# Patient Record
Sex: FEMALE | Race: BLACK OR AFRICAN AMERICAN | NOT HISPANIC OR LATINO | Employment: FULL TIME | URBAN - METROPOLITAN AREA
[De-identification: names, ages, dates, MRNs, and addresses within clinical notes are randomized per-mention and may not be internally consistent; named-entity substitution may affect disease eponyms.]

---

## 2018-01-10 ENCOUNTER — HOSPITAL ENCOUNTER (EMERGENCY)
Facility: HOSPITAL | Age: 23
Discharge: HOME | End: 2018-01-11
Attending: EMERGENCY MEDICINE | Admitting: EMERGENCY MEDICINE

## 2018-01-10 VITALS
HEIGHT: 68 IN | HEART RATE: 79 BPM | TEMPERATURE: 97.2 F | SYSTOLIC BLOOD PRESSURE: 127 MMHG | DIASTOLIC BLOOD PRESSURE: 74 MMHG | OXYGEN SATURATION: 98 % | WEIGHT: 180 LBS | BODY MASS INDEX: 27.28 KG/M2 | RESPIRATION RATE: 16 BRPM

## 2018-01-10 DIAGNOSIS — F33.1 MODERATE EPISODE OF RECURRENT MAJOR DEPRESSIVE DISORDER (HCC): Primary | ICD-10-CM

## 2018-01-10 LAB
AMPHET+METHAMPHET UR QL: NEGATIVE
BARBITURATES UR QL SCN: NEGATIVE
BENZODIAZ UR QL SCN: NEGATIVE
CANNABINOIDS SERPL QL: NEGATIVE
COCAINE UR QL: NEGATIVE
ETHANOL BLD-MCNC: <10 MG/DL (ref 0–10)
ETHANOL UR QL: <0.01 %
METHADONE UR QL SCN: NEGATIVE
OPIATES UR QL: NEGATIVE
OXYCODONE UR QL SCN: NEGATIVE

## 2018-01-10 PROCEDURE — 80307 DRUG TEST PRSMV CHEM ANLYZR: CPT | Performed by: NURSE PRACTITIONER

## 2018-01-10 PROCEDURE — 99283 EMERGENCY DEPT VISIT LOW MDM: CPT

## 2018-01-10 PROCEDURE — 90791 PSYCH DIAGNOSTIC EVALUATION: CPT

## 2018-01-10 RX ORDER — ESCITALOPRAM OXALATE 5 MG/1
10 TABLET ORAL DAILY
Qty: 30 TABLET | Refills: 0 | Status: SHIPPED | OUTPATIENT
Start: 2018-01-10 | End: 2018-01-11

## 2018-01-10 RX ORDER — ESCITALOPRAM OXALATE 5 MG/1
5 TABLET ORAL DAILY
COMMUNITY
End: 2018-01-10

## 2018-01-11 RX ORDER — ESCITALOPRAM OXALATE 20 MG/1
20 TABLET ORAL DAILY
Qty: 30 TABLET | Refills: 0 | Status: SHIPPED | OUTPATIENT
Start: 2018-01-11

## 2018-01-11 NOTE — ED PROVIDER NOTES
Pt presents to the ED c/o worsening depression after having a significant break up and starting her menstrual period. She has associated sleep disturbances and decreased appetite. She denies SI and hallucinations. She uses melatonin but has minimal results in staying asleep. Pt has not had to have any hospitalization for depression. On exam, RRR w/o murmur, CTAB, neck supple w/o lymph adenopathy, abd soft non tender and non distended with nml active BS. Discussed the plan to get the labs resulted and for her to speak to the [facility].      Attestation:  I supervised care provided by the midlevel provider.    We have discussed this patient's history, physical exam, and treatment plan.   I have reviewed the note and personally saw and examined the patient and agree with the plan of care.    Documentation assistance provided by gertrude Sheth for Dr. Castillo. Information recorded by the scribe was done at my direction and has been verified and validated by me.       Marquita Sheth  01/10/18 0594       Juan Jose Castillo MD  01/11/18 7726

## 2018-01-11 NOTE — ED NOTES
Per pt's mother- pt reports not wanting to be here anymore and she's also going through a break-up.  Pt reports sporadic episodes of these thoughts and starts to cry.      Priscila Fierro RN  01/10/18 2025

## 2018-01-11 NOTE — ED PROVIDER NOTES
EMERGENCY DEPARTMENT ENCOUNTER    Room Number:  43/43  Date seen:  1/10/2018  Time seen: 8:29 PM  PCP: DAYANA Felder    HPI:  Chief complaint: depression   Context:Coretta Le is a 22 y.o. female who presents to the ED with c/o depression and anxiety, which she has had for about 3 years. The pt has taken lexapro which she states helped improve her depression at first, but has since stopped relieving her depression. The pt states that her depression is worse around her menstrual period. The pt states that her depression has progressively worsened after she recently ended a relationship. She states that she has episodes where she has been continuously crying and cannot stop. The pt states that she has not gone to school for the last few days because of her continual depression. The pt does not currently see a therapist. The pt c/o decreased appetite.The pt denies hallucinations, SI, HI, or being in an abusive relationship.  The pt states that she occasionally uses marijuana, but that she does not use it regularly.      Timing:episodic  Duration: 3 years  Quality:depression  Intensity/Severity:moderate  Associated Symptoms:decreased appetite  Aggravating Factors:Pt states that her depression worsens around her period.   Alleviating Factors:none  Previous Episodes:The pt has a history of depression and anxiety.   Treatment before arrival:The pt has taken lexapro with no recent relief.       ALLERGIES  Nickel    PAST MEDICAL HISTORY  Active Ambulatory Problems     Diagnosis Date Noted   • No Active Ambulatory Problems     Resolved Ambulatory Problems     Diagnosis Date Noted   • No Resolved Ambulatory Problems     No Additional Past Medical History       PAST SURGICAL HISTORY  History reviewed. No pertinent surgical history.    FAMILY HISTORY  History reviewed. No pertinent family history.    SOCIAL HISTORY  Social History     Social History   • Marital status: Single     Spouse name: N/A   • Number of  children: N/A   • Years of education: N/A     Occupational History   • Not on file.     Social History Main Topics   • Smoking status: Never Smoker   • Smokeless tobacco: Not on file   • Alcohol use Yes      Comment: occasionally   • Drug use: Yes     Special: Marijuana   • Sexual activity: Not on file     Other Topics Concern   • Not on file     Social History Narrative   • No narrative on file       REVIEW OF SYSTEMS  Review of Systems   Constitutional: Positive for appetite change (decreased). Negative for activity change, diaphoresis and fever.   HENT: Negative for trouble swallowing.    Eyes: Negative for visual disturbance.   Respiratory: Negative for cough, chest tightness, shortness of breath and wheezing.    Cardiovascular: Negative for chest pain, palpitations and leg swelling.   Gastrointestinal: Negative for abdominal pain, diarrhea, nausea and vomiting.   Genitourinary: Negative for dysuria.   Musculoskeletal: Negative for back pain.   Skin: Negative for rash.   Neurological: Negative for dizziness, speech difficulty and light-headedness.   Psychiatric/Behavioral: The patient is nervous/anxious.         Depression       PHYSICAL EXAM  ED Triage Vitals   Temp Heart Rate Resp BP SpO2   01/10/18 2019 01/10/18 2019 01/10/18 2019 01/10/18 2019 01/10/18 2019   97.5 °F (36.4 °C) 66 18 111/72 98 %      Temp src Heart Rate Source Patient Position BP Location FiO2 (%)   01/10/18 2019 01/10/18 2019 -- -- --   Tympanic Monitor        Physical Exam   Constitutional: She is oriented to person, place, and time and well-developed, well-nourished, and in no distress. No distress.   HENT:   Head: Normocephalic and atraumatic.   Mouth/Throat: Uvula is midline and mucous membranes are normal.   Neck: Normal range of motion. Neck supple.   Cardiovascular: Normal rate, regular rhythm and S2 normal.  Exam reveals no gallop and no friction rub.    No murmur heard.  Split S1 heart sound   Pulmonary/Chest: Effort normal and  breath sounds normal. No respiratory distress. She has no decreased breath sounds. She has no wheezes. She has no rhonchi. She has no rales.   Abdominal: Soft. Normal appearance. There is no rebound and no guarding.   Musculoskeletal: Normal range of motion.   Neurological: She is alert and oriented to person, place, and time.   Skin: Skin is warm, dry and intact.   Psychiatric: Affect and judgment normal. She expresses no homicidal and no suicidal ideation. She expresses no suicidal plans and no homicidal plans.   The pt has a sad affect which is appropriate. The pt does not exhibit self harm behavior.    Nursing note and vitals reviewed.      LAB RESULTS  Recent Results (from the past 24 hour(s))   Urine Drug Screen - Urine, Clean Catch    Collection Time: 01/10/18  8:54 PM   Result Value Ref Range    Amphet/Methamphet, Screen Negative Negative    Barbiturates Screen, Urine Negative Negative    Benzodiazepine Screen, Urine Negative Negative    Cocaine Screen, Urine Negative Negative    Opiate Screen Negative Negative    THC, Screen, Urine Negative Negative    Methadone Screen, Urine Negative Negative    Oxycodone Screen, Urine Negative Negative   Ethanol    Collection Time: 01/10/18  9:25 PM   Result Value Ref Range    Ethanol <10 0 - 10 mg/dL    Ethanol % <0.010 %       I ordered the above labs and reviewed the results      MEDICATIONS GIVEN IN ER  Medications - No data to display      PROCEDURES  Procedures    COURSE & MEDICAL DECISION MAKING  Pertinent Labs and Imaging studies that were ordered and reviewed are noted above.  Results were reviewed/discussed with the patient and they were also made aware of online assess.  Pt also made aware that some labs, such as cultures, will not be resulted during ER visit and follow up with PMD is necessary.     PROGRESS AND CONSULTS    Progress Notes:    ED Course     2100: Ordered psych evaluation.     2150: Reviewed pt's history and workup with Dr. Castillo.  After a  "bedside evaluation; Dr Castillo agrees with the plan of care.    2300: Discussed pt's case with Tariq matta [facility]. He states that the pt can be discharged home with a referral to psychiatry.     2352:The patient's history, physical exam, and lab findings were discussed with the physician, who also performed a face to face history and physical exam.  I discussed all results and noted any abnormalities with patient.  Discussed absoute need to recheck abnormalities with their family physician.  I answered any of the patient's questions.  Discussed plan for discharge, as there is no emergent indication for admission.  Pt is agreeable and understands need for follow up and repeat testing.  Pt is aware that discharge does not mean that nothing is wrong but it indicates no emergency is present and they must continue care with their family physician.  Pt is discharged with instructions to follow up with primary care doctor to have their blood pressure rechecked.       Disposition vitals:  /74 (BP Location: Right arm, Patient Position: Sitting)  Pulse 79  Temp 97.2 °F (36.2 °C) (Oral)   Resp 16  Ht 172.7 cm (68\")  Wt 81.6 kg (180 lb)  SpO2 98%  BMI 27.37 kg/m2      DIAGNOSIS  Final diagnoses:   Moderate episode of recurrent major depressive disorder       FOLLOW UP   DAYANA Felder  [address] [address]  [phone]    Schedule an appointment as soon as possible for a visit        RX     Medication List      Changed          escitalopram 5 MG tablet   Commonly known as:  LEXAPRO   Take 2 tablets by mouth Daily.   What changed:  how much to take             Documentation assistance provided by gertrude Garcia for LORI Monterroso.  Information recorded by the franciscoibnasim was done at my direction and has been verified and validated by me.  Electronically signed by Paola Garcia on 1/10/2018 at time 11:52 PM           Paola Garcia  01/10/18 9382       LORI Andersen  01/11/18 " 7038

## 2018-01-11 NOTE — DISCHARGE INSTRUCTIONS
Return Precautions    Although you are being discharged from the ED today, I encourage you to return for worsening symptoms.  Things can, and do, change such that treatment at home with medication may not be adequate.      Specifically, return for any of the following:    Chest pain, shortness of breath, pain or nausea and vomiting not controlled by medications provided.    Please make a follow up with your Primary Care Provider for a blood pressure recheck.     It is reasonable to increase your dose of Lexapro to 10 mg daily.  Get some daily exercise.  Make yourself do some walking outside EVEN IF YOU DON'T WANT TO.     Return to Emergency Room for worsening depression ESPECIALLY if you have any thoughts of hurting yourself    Please follow up with the Outpatient Resources that Tariq the [facility] RN has provided you.

## 2018-01-11 NOTE — CONSULTS
"Pt's friend Mandeep Phelps in ED, friend denies any recent statements by pt indicating thoughts of harming herself or others.     Pt states she came in because \"I've just been really down and feeling depressed for the last few days\". States she her partner or about 7 months, broke up with her 3 days ago. States she was taking escitalopram (lexapro), prescribed by PCP, states it was initially helpful, but hasn't been working as well lately, particularly prior to her menstrual cycle monthly. States she did counseling for a few months 3 years ago.     Denies ever having thought of killing or harming herself. Denies any recent thoughts of harming self/others. Denies any hx of intentional self harm bx's or inpatient psychiatric hospitalization.     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 States she hasn't been to any of her classes. States she was previously active with [employer]'s counseling services for students. States she is not currently planning to withdraw, but that the withdrawal date is in a few weeks, but doesn't want to do this. Talks with me about keeping the option open to withdraw if that's what she needs to do r/t her depression.     Reports she uses marijuana \"maybe once every 2 or 3 months\". Denies other drugs. Denies tobacco use. Reports drinking 1 alcoholic drink 3-4 times weekly.     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 Reports she has asked her friend Molly to take over more of her [email or web address] duties, given her depression.     Denies any recent thoughts of wishing she were dead, \"not wishing I were dead, when I've been having the crying spells, I just wish I could sleep\". Denies wishing she could sleep and not wake up, just that she could sleep until she felt better.     Reports she takes melatonin, but awakens frequently, sleeping about 3 hours daily for past 3 days.     Asked to identify a reason NOT to try to harm or kill herself: \"I know " "I'll get through this eventually, and I have things I want to do in the future ... I want to be a teacher, and I want to get  and have children\".     Engages in discussion about outpatient mental health services, requesting additional referrals to [employer] student services, stating that in the past her counselor graduated, and she would have to start all over with another counselor. Engages in detailed f/u discussion indicating additional future orientation and intention of outpatient f/u.     I then spoke with pt's parents, Bettyjodi Le, [phone], and Sparkle Barr, [phone]. Both patents deny and recent statements by pt indicating thoughts of harming herself or others. Parents report they went to pt's PCP, who recommended they go to [facility], who were closing as they arrived, so parents brought pt to ED.     Mom thinks pt is on 10 mg daily of lexapro.     Plan to d/c with outpatient referrals, ALFONZO Vargas agrees with plan.     "

## 2021-06-09 ENCOUNTER — IMMUNIZATION (OUTPATIENT)
Dept: VACCINE CLINIC | Facility: HOSPITAL | Age: 26
End: 2021-06-09

## 2021-06-09 PROCEDURE — 91300 HC SARSCOV02 VAC 30MCG/0.3ML IM: CPT | Performed by: INTERNAL MEDICINE

## 2021-06-09 PROCEDURE — 0001A: CPT | Performed by: INTERNAL MEDICINE

## 2021-06-30 ENCOUNTER — IMMUNIZATION (OUTPATIENT)
Dept: VACCINE CLINIC | Facility: HOSPITAL | Age: 26
End: 2021-06-30

## 2021-06-30 PROCEDURE — 91300 HC SARSCOV02 VAC 30MCG/0.3ML IM: CPT | Performed by: INTERNAL MEDICINE

## 2021-06-30 PROCEDURE — 0002A: CPT | Performed by: INTERNAL MEDICINE

## 2024-10-28 ENCOUNTER — HOSPITAL ENCOUNTER (EMERGENCY)
Facility: HOSPITAL | Age: 29
Discharge: HOME | End: 2024-10-29
Admitting: EMERGENCY MEDICINE
Payer: COMMERCIAL

## 2024-10-28 VITALS
DIASTOLIC BLOOD PRESSURE: 92 MMHG | SYSTOLIC BLOOD PRESSURE: 146 MMHG | HEIGHT: 70 IN | BODY MASS INDEX: 27.2 KG/M2 | HEART RATE: 88 BPM | OXYGEN SATURATION: 98 % | WEIGHT: 190 LBS | RESPIRATION RATE: 16 BRPM | TEMPERATURE: 98 F

## 2024-10-28 DIAGNOSIS — R51.9 ACUTE NONINTRACTABLE HEADACHE, UNSPECIFIED HEADACHE TYPE: Primary | ICD-10-CM

## 2024-10-28 PROCEDURE — 93005 ELECTROCARDIOGRAM TRACING: CPT | Performed by: NURSE PRACTITIONER

## 2024-10-28 PROCEDURE — 25010000002 DROPERIDOL PER 5 MG: Performed by: NURSE PRACTITIONER

## 2024-10-28 PROCEDURE — 99284 EMERGENCY DEPT VISIT MOD MDM: CPT

## 2024-10-28 PROCEDURE — 96374 THER/PROPH/DIAG INJ IV PUSH: CPT

## 2024-10-28 RX ORDER — DIPHENHYDRAMINE HYDROCHLORIDE 50 MG/ML
INJECTION INTRAMUSCULAR; INTRAVENOUS
Status: DISCONTINUED
Start: 2024-10-28 | End: 2024-10-29 | Stop reason: HOSPADM

## 2024-10-28 RX ORDER — DIPHENHYDRAMINE HYDROCHLORIDE 50 MG/ML
25 INJECTION INTRAMUSCULAR; INTRAVENOUS ONCE
Status: COMPLETED | OUTPATIENT
Start: 2024-10-29 | End: 2024-10-29

## 2024-10-28 RX ORDER — DROPERIDOL 2.5 MG/ML
2.5 INJECTION, SOLUTION INTRAMUSCULAR; INTRAVENOUS ONCE
Status: COMPLETED | OUTPATIENT
Start: 2024-10-28 | End: 2024-10-28

## 2024-10-28 RX ADMIN — DROPERIDOL 2.5 MG: 2.5 INJECTION, SOLUTION INTRAMUSCULAR; INTRAVENOUS at 23:49

## 2024-10-29 ENCOUNTER — APPOINTMENT (OUTPATIENT)
Dept: CT IMAGING | Facility: HOSPITAL | Age: 29
End: 2024-10-29
Payer: COMMERCIAL

## 2024-10-29 LAB
QT INTERVAL: 416 MS
QTC INTERVAL: 398 MS

## 2024-10-29 PROCEDURE — 96375 TX/PRO/DX INJ NEW DRUG ADDON: CPT

## 2024-10-29 PROCEDURE — 25010000002 DIPHENHYDRAMINE PER 50 MG: Performed by: NURSE PRACTITIONER

## 2024-10-29 PROCEDURE — 70450 CT HEAD/BRAIN W/O DYE: CPT

## 2024-10-29 RX ADMIN — DIPHENHYDRAMINE HYDROCHLORIDE 25 MG: 50 INJECTION, SOLUTION INTRAMUSCULAR; INTRAVENOUS at 00:00

## 2024-10-29 NOTE — ED PROVIDER NOTES
Subjective   History of Present Illness  Patient is a 29-year-old female presents emergency department complaint of a headache.  She reports a history of migraines, and has had similar headaches before.  This began this afternoon.  Denies any trauma or injury reports photophobia, nausea vomiting.  No neck pain or stiffness.  No fevers.    Patient denies visual disturbances, speech disturbances, facial droop, unilateral weakness, numbness or tingling.  Denies difficulty walking or lethargy.    Denies a thunderclap headache.    Reports the only home medication is escitalopram    Pt reports Is being allergic to Zofran.   Review of Systems    History reviewed. No pertinent past medical history.    Allergies   Allergen Reactions    Droperidol Anxiety and Palpitations    Nickel        History reviewed. No pertinent surgical history.    History reviewed. No pertinent family history.    Social History     Socioeconomic History    Marital status: Single   Tobacco Use    Smoking status: Never   Substance and Sexual Activity    Alcohol use: Yes     Comment: occasionally    Drug use: Yes     Types: Marijuana           Objective   Physical Exam  Vitals and nursing note reviewed.   Constitutional:       Appearance: Normal appearance. She is not toxic-appearing.   HENT:      Head: Normocephalic and atraumatic.      Nose: Nose normal.      Mouth/Throat:      Mouth: Mucous membranes are moist.      Pharynx: Oropharynx is clear.   Eyes:      Extraocular Movements: Extraocular movements intact.      Conjunctiva/sclera: Conjunctivae normal.      Pupils: Pupils are equal, round, and reactive to light.   Neck:      Trachea: Trachea and phonation normal.      Meningeal: Brudzinski's sign absent.   Cardiovascular:      Rate and Rhythm: Normal rate and regular rhythm.      Heart sounds: Normal heart sounds. No murmur heard.     No friction rub. No gallop.   Pulmonary:      Effort: Pulmonary effort is normal.      Breath sounds: Normal  breath sounds.   Abdominal:      General: Bowel sounds are normal.      Palpations: Abdomen is soft.   Musculoskeletal:         General: Normal range of motion.      Cervical back: Normal range of motion and neck supple. No rigidity.      Right lower leg: No edema.      Left lower leg: No edema.   Skin:     General: Skin is warm and dry.      Capillary Refill: Capillary refill takes less than 2 seconds.      Findings: No erythema or rash.   Neurological:      Mental Status: She is alert and oriented to person, place, and time.         Procedures           ED Course  ED Course as of 10/29/24 0142   Tue Oct 29, 2024   0008 Pt had reaction to droperidol. Became very anxious  [LB]   0110 Pt feels much better. She wants to go home.  [LB]      ED Course User Index  [LB] Tania Huynh APRN      CT Head Without Contrast    Result Date: 10/29/2024  Impression: No acute intracranial process. Electronically Signed: Brandy Fowler MD  10/29/2024 12:29 AM EDT  Workstation ID: [id number]                                            Medical Decision Making  Chart Review: Previous ED note 1/10/2018  EKG interpreted independently per ED attending physican-Dr. Mesa.  Sinus bradycardia rate of 55.  , QTc 398.  No acute ST changes.  No previous.      Pt was Placed on appropriate monitoring.  Differential diagnoses considered for patient presentation, this list is not all inclusive of diagnoses considered: Intracranial hemorrhage, migraine  Patient presents to the ED for the above complaint, underwent the above, exam and workup.  Patient has a grossly normal neurological exam.  Doubt intracranial hemorrhage or SAH.  Patient does report this has been similar to her previous headaches and migraines, however she has not any imaging in the past therefore imaging was obtained reveals no acute findings today.    She was given droperidol initially, however she did have a reaction this, can became very anxious and agitated, she  was given Benadryl and symptoms resolved.  On reexam her pain is gone, and she feels much improved she wants to go home.  I have given her information to follow-up with neurology, advise follow-up PCP as well    Considertion was given for admission, however patient has reassuring exam and workup in the ed and appears appropriate for discharge home and continued outpatient care.     We discussed my findings, plan of care, discharge instructions, the importance of follow up with their PCP/ and or specialist for repeat evaluation and to discuss any abnormal findings in labs or imaging that warrant further outpatient evaluation. We discussed that although a definitive diagnosis is not always found in the ED, it is believed emergent conditions have been ruled out, and patient is safe for discharge at this time.  We discussed return precautions for the emergency department.  Patient verbalizes understandings, and agrees with current plan of care.      Note Disclaimer: At [facility], we believe that sharing information builds trust and better relationships. You are receiving this note because you recently visited [facility]. It is possible you will see health information before a provider has talked with you about it. This kind of information can be easy to misunderstand. To help you fully understand what it means for your health, we urge you to discuss this note with your provider  Note dictated utilizing Dragon Dictation.  Appropriate PPE worn during patient interactions.        Final diagnoses:   Acute nonintractable headache, unspecified headache type       ED Disposition  ED Disposition       ED Disposition   Discharge    Condition   Stable    Comment   --               Cortez Celeste PA  [address]  [facility] [address] [phone]          [facility] EMERGENCY DEPARTMENT  [facility] [facility] [address] [phone]        Seipel, Joseph F, MD  [address]  [facility] [address]  [phone]    Schedule an appointment as soon as possible for a visit            Medication List      No changes were made to your prescriptions during this visit.            Tania Huynh, APRN  10/29/24 0142

## 2024-10-29 NOTE — DISCHARGE INSTRUCTIONS
Please follow-up with neurology as above for evaluation of headaches  Return to ED for new or worsening symptoms  Follow-up with your primary care provider, call for an appointment

## 2024-11-09 ENCOUNTER — HOSPITAL ENCOUNTER (EMERGENCY)
Facility: HOSPITAL | Age: 29
Discharge: HOME | End: 2024-11-09
Attending: EMERGENCY MEDICINE
Payer: MEDICAID

## 2024-11-09 ENCOUNTER — HOSPITAL ENCOUNTER (EMERGENCY)
Facility: HOSPITAL | Age: 29
Discharge: HOME | End: 2024-11-09
Payer: MEDICAID

## 2024-11-09 VITALS
TEMPERATURE: 98.3 F | OXYGEN SATURATION: 100 % | RESPIRATION RATE: 20 BRPM | HEIGHT: 68 IN | SYSTOLIC BLOOD PRESSURE: 111 MMHG | BODY MASS INDEX: 30.51 KG/M2 | DIASTOLIC BLOOD PRESSURE: 71 MMHG | HEART RATE: 59 BPM | WEIGHT: 201.28 LBS

## 2024-11-09 VITALS
DIASTOLIC BLOOD PRESSURE: 83 MMHG | WEIGHT: 201.28 LBS | RESPIRATION RATE: 20 BRPM | TEMPERATURE: 98.8 F | HEIGHT: 68 IN | SYSTOLIC BLOOD PRESSURE: 123 MMHG | BODY MASS INDEX: 30.51 KG/M2 | HEART RATE: 61 BPM | OXYGEN SATURATION: 97 %

## 2024-11-09 DIAGNOSIS — R51.9 ACUTE NONINTRACTABLE HEADACHE, UNSPECIFIED HEADACHE TYPE: Primary | ICD-10-CM

## 2024-11-09 DIAGNOSIS — R51.9 NONINTRACTABLE HEADACHE, UNSPECIFIED CHRONICITY PATTERN, UNSPECIFIED HEADACHE TYPE: Primary | ICD-10-CM

## 2024-11-09 PROCEDURE — 96372 THER/PROPH/DIAG INJ SC/IM: CPT

## 2024-11-09 PROCEDURE — 25010000002 KETOROLAC TROMETHAMINE PER 15 MG: Performed by: EMERGENCY MEDICINE

## 2024-11-09 PROCEDURE — 99282 EMERGENCY DEPT VISIT SF MDM: CPT

## 2024-11-09 RX ORDER — KETOROLAC TROMETHAMINE 30 MG/ML
30 INJECTION, SOLUTION INTRAMUSCULAR; INTRAVENOUS ONCE
Status: COMPLETED | OUTPATIENT
Start: 2024-11-09 | End: 2024-11-09

## 2024-11-09 RX ORDER — BUTALBITAL, ACETAMINOPHEN AND CAFFEINE 50; 325; 40 MG/1; MG/1; MG/1
1 TABLET ORAL EVERY 6 HOURS PRN
Qty: 10 TABLET | Refills: 0 | Status: SHIPPED | OUTPATIENT
Start: 2024-11-09

## 2024-11-09 RX ADMIN — KETOROLAC TROMETHAMINE 30 MG: 30 INJECTION, SOLUTION INTRAMUSCULAR at 09:50

## 2024-11-09 NOTE — ED PROVIDER NOTES
"Subjective   History of Present Illness  29-year-old female states she has had a long history of headaches including migraine management as a teenager who states that she was seen in the emergency room 2 weeks ago with headache and had a negative CT scan and had improvement with treatment in the emergency room but headache is returned and has been fairly constant over the last week.  She reports no associated vomiting or fevers.  She is not describing thunderclap onset.  She reports no blurry vision or numbness or weakness or stiff neck.  The pain is in her bilateral eyebrows.  Review of Systems  Last menstruation started 1 week ago  No past medical history on file.  Migraines  Allergies   Allergen Reactions    Droperidol Anxiety and Palpitations    Nickel        No past surgical history on file.    No family history on file.    Social History     Socioeconomic History    Marital status: Single   Tobacco Use    Smoking status: Never   Substance and Sexual Activity    Alcohol use: Yes     Comment: occasionally    Drug use: Yes     Types: Marijuana     Prior to Admission medications    Medication Sig Start Date End Date Taking? Authorizing Provider   butalbital-acetaminophen-caffeine (FIORICET, ESGIC) -40 MG per tablet Take 1 tablet by mouth Every 6 (Six) Hours As Needed for Headache. 11/9/24   Andrew Hall MD   escitalopram (LEXAPRO) 20 MG tablet Take 1 tablet by mouth Daily. 1/11/18   Juan Jose Castillo MD     /75   Pulse 73   Temp 98.1 °F (36.7 °C)   Resp 20   Ht 172.7 cm (68\")   Wt 91.3 kg (201 lb 4.5 oz)   LMP 11/08/2024   SpO2 96%   BMI 30.60 kg/m²         Objective   Physical Exam  General: Well-developed well-appearing, no acute distress, alert and appropriate  Eyes: Pupils round and reactive, sclera nonicteric, extraocular motion intact, some minor tenderness palpation over the bilateral eyebrows, no soft tissue swelling, lids normal  HEENT: Mucous membranes moist, no mucosal " swelling  Neck: Supple, no nuchal rigidity,   Respirations: Respirations nonlabored, equal breath sounds bilaterally, clear lungs  Heart regular rate and rhythm, no murmurs rubs or gallops,   Abdomen soft nontender nondistended,   Extremities no clubbing cyanosis or edema, calves are symmetric and nontender  Neuro cranial nerves II through XII intact , normal sensory/motor function and strength in four extremities, no slurred speech, no facial droop, normal finger to nose, normal heel to shin, no nuchal rigidity  Psych oriented, pleasant affect  Skin no rash,   Procedures           ED Course                    No data recorded                             Medical Decision Making  Differential diagnosis including meningitis, subarachnoid hemorrhage, cavernous sinus thrombosis    I reviewed the CT report from 2 weeks ago showing no acute abnormalities.  Notably patient is not having headache characteristics of subarachnoid or meningitis.  She does report a long history of headache issues and is scheduling to see neurologist outpatient.  CST felt to be unlikely based on patient's history and today.  Tension type headache is favored, we also discussed the potential need for optometry evaluation to rule out any eyestrain as an etiology as she does wear glasses chronically and she is not had her vision checked in over a year.  Based on exam and history headache appears to be of a benign origin and we discussed acute management.  She wishes to forego any sedative medications today so she can drive home so she was ordered some Toradol in the emergency room and was prescribed Fioricet.  She is encouraged to follow-up with primary care and neurology as well as with her eye doctor.  Patient voiced understanding the plan and was discharged in condition and was given warning signs for return.    Amount and/or Complexity of Data Reviewed  External Data Reviewed: radiology and notes.     Details: Patient treated for migraine  headache couple weeks ago droperidol and where she had an adverse reaction to the droperidol.    Risk  Prescription drug management.        Final diagnoses:   Acute nonintractable headache, unspecified headache type       ED Disposition  ED Disposition       ED Disposition   Discharge    Condition   Stable    Comment   --               No follow-up provider specified.       Medication List        New Prescriptions      butalbital-acetaminophen-caffeine -40 MG per tablet  Commonly known as: FIORICET, ESGIC  Take 1 tablet by mouth Every 6 (Six) Hours As Needed for Headache.               Where to Get Your Medications        These medications were sent to [facility]/pharmacy #[id number] - [place], [place] - [address] [place] [address] RD. AT [facility] - [phone]  - [phone]   [address] [place] [address] [address] [place] [id number]      Phone: [phone]   butalbital-acetaminophen-caffeine -40 MG per tablet            Andrew Hall MD  11/09/24 0942       Andrew Hall MD  11/09/24 0959

## 2024-11-09 NOTE — DISCHARGE INSTRUCTIONS
Follow-up with primary care and neurologist as planned.  Also consider eye exam with optometrist.  Fioricet for headache.  Return for increased pain, vomiting, fever or any other concerns.

## 2024-11-10 NOTE — ED PROVIDER NOTES
"Subjective   History of Present Illness  Chief complaint: Cephalgia      Context: Patient is a 29-year-old female who presents with friend with complaints of frontal headache and \"eyestrain.\"  She states she was seen here earlier today for the same although her headache has improved, she has not yet filled her Fioricet.  She states she does agree that she likely has some eyestrain and has not seen ophthalmology to have her glasses evaluated in over a year and came in today stating she wanted to be evaluated for eyestrain.  She denies any new symptoms from her earlier evaluation today, no reported fever thunderclap type noise.      PCP:           Review of Systems   Constitutional:  Negative for fever.       No past medical history on file.    Allergies   Allergen Reactions    Droperidol Anxiety and Palpitations    Nickel        No past surgical history on file.    No family history on file.    Social History     Socioeconomic History    Marital status: Single   Tobacco Use    Smoking status: Never   Substance and Sexual Activity    Alcohol use: Yes     Comment: occasionally    Drug use: Yes     Types: Marijuana           Objective   Physical Exam    Vital signs and traige nurse note reviewed.  Constitutional:  Awake, alert; well developed and well nourished.  No acute distress, friend at bedside.  Nontoxic in appearance sitting with the lights on in no acute distress  HEENT:  Normocephalic, atraumatic;  with intact EOM;    Neck: Supple, full range of motion without pain;   Pulmonary: Respiratory effort regular, nonlabored; :   Neuro: Alert oriented x3, speech is clear and appropriate.      Procedures           ED Course                    No data recorded                             Medical Decision Making      /83 (BP Location: Left arm)   Pulse 61   Temp 98.8 °F (37.1 °C) (Oral)   Resp 20   Ht 172.7 cm (68\")   Wt 91.3 kg (201 lb 4.5 oz)   LMP 11/08/2024   SpO2 97%   BMI 30.60 kg/m²      Chart review: " ER evaluation 1029 and 11 9 for headache      Radiology interpretation: CT reviewed from 1029  Lab interpretation:  Labs ordered but not felt to be emergently warranted          Appropriate PPE worn during exam.  Patient states she is here for ophthalmology evaluation for her concern about eyestrain and to have her glasses adjusted.  We discussed she would need to see an ophthalmologist for that; she was seen earlier today approximately a week ago for thought to be a tension type headache and she states she does not want any further evaluation and will take her prescription Fioricet that she was given earlier today.  She denies any other new complaints just wanted to have her eyes evaluated.  She states she will follow-up with her ophthalmologist for that.  Patient friend at bedside.      i discussed findings with patient who voices understanding of discharge instructions, signs and symptoms requiring return to ED; discharged improved and in stable condition with follow up for re-evaluation.  This document is intended for medical expert use only. Reading of this document by patients and/or patient's family without participating medical staff guidance may result in misinterpretation and unintended morbidity.  Any interpretation of such data is the responsibility of the patient and/or family member responsible for the patient in concert with their primary or specialist providers, not to be left for sources of online searches such as [employer], [employer] or similar queries. Relying on these approaches to knowledge may result in misinterpretation, misguided goals of care and even death should patients or family members try recommendations outside of the realm of professional medical care in a supervised inpatient environment.         Problems Addressed:  Nonintractable headache, unspecified chronicity pattern, unspecified headache type: acute illness or injury        Final diagnoses:   Nonintractable headache, unspecified  chronicity pattern, unspecified headache type       ED Disposition  ED Disposition       ED Disposition   Discharge    Condition   Stable    Comment   --               Cortez Celeste, PA  [address] [place] [address]  [phone]               Medication List      No changes were made to your prescriptions during this visit.            Luba Buchanan, APRN  11/09/24 1943

## 2024-11-10 NOTE — DISCHARGE INSTRUCTIONS
Prescriptions as directed.  Follow-up with your ophthalmologist and primary care doctor.  Return for any new or worsening symptoms.  Limit your bluelight exposure

## 2025-07-09 ENCOUNTER — HOSPITAL ENCOUNTER (EMERGENCY)
Facility: HOSPITAL | Age: 30
Discharge: HOME | End: 2025-07-10
Attending: EMERGENCY MEDICINE
Payer: MEDICAID

## 2025-07-09 ENCOUNTER — APPOINTMENT (OUTPATIENT)
Dept: CT IMAGING | Facility: HOSPITAL | Age: 30
End: 2025-07-09
Payer: MEDICAID

## 2025-07-09 DIAGNOSIS — R10.9 ABDOMINAL PAIN, UNSPECIFIED ABDOMINAL LOCATION: Primary | ICD-10-CM

## 2025-07-09 DIAGNOSIS — K59.00 CONSTIPATION, UNSPECIFIED CONSTIPATION TYPE: ICD-10-CM

## 2025-07-09 DIAGNOSIS — N83.209 CYST OF OVARY, UNSPECIFIED LATERALITY: ICD-10-CM

## 2025-07-09 DIAGNOSIS — R11.2 NAUSEA AND VOMITING, UNSPECIFIED VOMITING TYPE: ICD-10-CM

## 2025-07-09 LAB
ALBUMIN SERPL-MCNC: 4.7 G/DL (ref 3.5–5.2)
ALBUMIN/GLOB SERPL: 1.5 G/DL
ALP SERPL-CCNC: 57 U/L (ref 39–117)
ALT SERPL W P-5'-P-CCNC: <5 U/L (ref 1–33)
ANION GAP SERPL CALCULATED.3IONS-SCNC: 17.1 MMOL/L (ref 5–15)
AST SERPL-CCNC: 17 U/L (ref 1–32)
BASOPHILS # BLD MANUAL: 0.08 10*3/MM3 (ref 0–0.2)
BASOPHILS NFR BLD MANUAL: 1 % (ref 0–1.5)
BILIRUB SERPL-MCNC: 0.6 MG/DL (ref 0–1.2)
BUN SERPL-MCNC: 8.7 MG/DL (ref 6–20)
BUN/CREAT SERPL: 11.3 (ref 7–25)
CALCIUM SPEC-SCNC: 9.6 MG/DL (ref 8.6–10.5)
CHLORIDE SERPL-SCNC: 104 MMOL/L (ref 98–107)
CO2 SERPL-SCNC: 18.9 MMOL/L (ref 22–29)
CREAT SERPL-MCNC: 0.77 MG/DL (ref 0.57–1)
DEPRECATED RDW RBC AUTO: 37 FL (ref 37–54)
EGFRCR SERPLBLD CKD-EPI 2021: 107.2 ML/MIN/1.73
ERYTHROCYTE [DISTWIDTH] IN BLOOD BY AUTOMATED COUNT: 13 % (ref 12.3–15.4)
GLOBULIN UR ELPH-MCNC: 3.1 GM/DL
GLUCOSE SERPL-MCNC: 148 MG/DL (ref 65–99)
HCG SERPL QL: NEGATIVE
HCT VFR BLD AUTO: 37.1 % (ref 34–46.6)
HGB BLD-MCNC: 13 G/DL (ref 12–15.9)
LIPASE SERPL-CCNC: 27 U/L (ref 13–60)
LYMPHOCYTES # BLD MANUAL: 0.89 10*3/MM3 (ref 0.7–3.1)
LYMPHOCYTES NFR BLD MANUAL: 4 % (ref 5–12)
MCH RBC QN AUTO: 27.7 PG (ref 26.6–33)
MCHC RBC AUTO-ENTMCNC: 35 G/DL (ref 31.5–35.7)
MCV RBC AUTO: 79.1 FL (ref 79–97)
MONOCYTES # BLD: 0.32 10*3/MM3 (ref 0.1–0.9)
NEUTROPHILS # BLD AUTO: 6.76 10*3/MM3 (ref 1.7–7)
NEUTROPHILS NFR BLD MANUAL: 84 % (ref 42.7–76)
PLATELET # BLD AUTO: 281 10*3/MM3 (ref 140–450)
PMV BLD AUTO: 11.3 FL (ref 6–12)
POTASSIUM SERPL-SCNC: 3.7 MMOL/L (ref 3.5–5.2)
PROT SERPL-MCNC: 7.8 G/DL (ref 6–8.5)
RBC # BLD AUTO: 4.69 10*6/MM3 (ref 3.77–5.28)
RBC MORPH BLD: NORMAL
SCAN SLIDE: NORMAL
SMALL PLATELETS BLD QL SMEAR: ADEQUATE
SODIUM SERPL-SCNC: 140 MMOL/L (ref 136–145)
VARIANT LYMPHS NFR BLD MANUAL: 11 % (ref 19.6–45.3)
WBC MORPH BLD: NORMAL
WBC NRBC COR # BLD AUTO: 8.05 10*3/MM3 (ref 3.4–10.8)

## 2025-07-09 PROCEDURE — 96375 TX/PRO/DX INJ NEW DRUG ADDON: CPT

## 2025-07-09 PROCEDURE — 96374 THER/PROPH/DIAG INJ IV PUSH: CPT

## 2025-07-09 PROCEDURE — 74177 CT ABD & PELVIS W/CONTRAST: CPT

## 2025-07-09 PROCEDURE — 85007 BL SMEAR W/DIFF WBC COUNT: CPT | Performed by: EMERGENCY MEDICINE

## 2025-07-09 PROCEDURE — 25010000002 DIPHENHYDRAMINE PER 50 MG: Performed by: EMERGENCY MEDICINE

## 2025-07-09 PROCEDURE — 81003 URINALYSIS AUTO W/O SCOPE: CPT | Performed by: EMERGENCY MEDICINE

## 2025-07-09 PROCEDURE — 99285 EMERGENCY DEPT VISIT HI MDM: CPT

## 2025-07-09 PROCEDURE — 25010000002 ONDANSETRON PER 1 MG: Performed by: EMERGENCY MEDICINE

## 2025-07-09 PROCEDURE — 85025 COMPLETE CBC W/AUTO DIFF WBC: CPT | Performed by: EMERGENCY MEDICINE

## 2025-07-09 PROCEDURE — 25510000001 IOPAMIDOL PER 1 ML: Performed by: EMERGENCY MEDICINE

## 2025-07-09 PROCEDURE — 84703 CHORIONIC GONADOTROPIN ASSAY: CPT | Performed by: EMERGENCY MEDICINE

## 2025-07-09 PROCEDURE — 25010000002 HYDROMORPHONE PER 4 MG: Performed by: EMERGENCY MEDICINE

## 2025-07-09 PROCEDURE — 83690 ASSAY OF LIPASE: CPT | Performed by: EMERGENCY MEDICINE

## 2025-07-09 PROCEDURE — 25810000003 SODIUM CHLORIDE 0.9 % SOLUTION: Performed by: EMERGENCY MEDICINE

## 2025-07-09 PROCEDURE — 80053 COMPREHEN METABOLIC PANEL: CPT | Performed by: EMERGENCY MEDICINE

## 2025-07-09 RX ORDER — HYDROMORPHONE HYDROCHLORIDE 1 MG/ML
0.5 INJECTION, SOLUTION INTRAMUSCULAR; INTRAVENOUS; SUBCUTANEOUS ONCE
Refills: 0 | Status: COMPLETED | OUTPATIENT
Start: 2025-07-09 | End: 2025-07-09

## 2025-07-09 RX ORDER — SODIUM CHLORIDE 0.9 % (FLUSH) 0.9 %
10 SYRINGE (ML) INJECTION AS NEEDED
Status: DISCONTINUED | OUTPATIENT
Start: 2025-07-09 | End: 2025-07-10 | Stop reason: HOSPADM

## 2025-07-09 RX ORDER — IOPAMIDOL 755 MG/ML
100 INJECTION, SOLUTION INTRAVASCULAR
Status: COMPLETED | OUTPATIENT
Start: 2025-07-09 | End: 2025-07-09

## 2025-07-09 RX ORDER — DIPHENHYDRAMINE HYDROCHLORIDE 50 MG/ML
25 INJECTION, SOLUTION INTRAMUSCULAR; INTRAVENOUS ONCE
Status: COMPLETED | OUTPATIENT
Start: 2025-07-09 | End: 2025-07-09

## 2025-07-09 RX ORDER — ONDANSETRON 2 MG/ML
4 INJECTION INTRAMUSCULAR; INTRAVENOUS ONCE
Status: COMPLETED | OUTPATIENT
Start: 2025-07-09 | End: 2025-07-09

## 2025-07-09 RX ADMIN — ONDANSETRON 4 MG: 2 INJECTION, SOLUTION INTRAMUSCULAR; INTRAVENOUS at 21:47

## 2025-07-09 RX ADMIN — SODIUM CHLORIDE 500 ML: 9 INJECTION, SOLUTION INTRAVENOUS at 21:46

## 2025-07-09 RX ADMIN — DIPHENHYDRAMINE HYDROCHLORIDE 25 MG: 50 INJECTION INTRAMUSCULAR; INTRAVENOUS at 22:07

## 2025-07-09 RX ADMIN — IOPAMIDOL 100 ML: 755 INJECTION, SOLUTION INTRAVENOUS at 22:44

## 2025-07-09 RX ADMIN — HYDROMORPHONE HYDROCHLORIDE 0.5 MG: 1 INJECTION, SOLUTION INTRAMUSCULAR; INTRAVENOUS; SUBCUTANEOUS at 21:47

## 2025-07-09 NOTE — Clinical Note
[facility] EMERGENCY DEPARTMENT  [address] [facility] [place] [address]  Phone: [phone]    Coretta Le was seen and treated in our emergency department on 7/9/2025.  She may return to work on 07/13/2025.         Thank you for choosing [facility].    Nancy Rojas, RN

## 2025-07-10 VITALS
HEIGHT: 68 IN | DIASTOLIC BLOOD PRESSURE: 68 MMHG | SYSTOLIC BLOOD PRESSURE: 115 MMHG | OXYGEN SATURATION: 100 % | TEMPERATURE: 98.2 F | RESPIRATION RATE: 16 BRPM | HEART RATE: 89 BPM | WEIGHT: 191.8 LBS | BODY MASS INDEX: 29.07 KG/M2

## 2025-07-10 LAB
BILIRUB UR QL STRIP: NEGATIVE
CLARITY UR: CLEAR
COLOR UR: YELLOW
GLUCOSE UR STRIP-MCNC: NEGATIVE MG/DL
HGB UR QL STRIP.AUTO: NEGATIVE
KETONES UR QL STRIP: ABNORMAL
LEUKOCYTE ESTERASE UR QL STRIP.AUTO: NEGATIVE
NITRITE UR QL STRIP: NEGATIVE
PH UR STRIP.AUTO: 8.5 [PH] (ref 5–8)
PROT UR QL STRIP: NEGATIVE
SP GR UR STRIP: 1.08 (ref 1–1.03)
UROBILINOGEN UR QL STRIP: ABNORMAL

## 2025-07-10 RX ORDER — DOCUSATE SODIUM 100 MG/1
100 CAPSULE, LIQUID FILLED ORAL 2 TIMES DAILY
Qty: 60 CAPSULE | Refills: 0 | Status: SHIPPED | OUTPATIENT
Start: 2025-07-10

## 2025-07-10 RX ORDER — DICYCLOMINE HCL 20 MG
20 TABLET ORAL EVERY 8 HOURS PRN
Qty: 30 TABLET | Refills: 0 | Status: SHIPPED | OUTPATIENT
Start: 2025-07-10

## 2025-07-10 RX ORDER — ONDANSETRON 4 MG/1
4 TABLET, ORALLY DISINTEGRATING ORAL EVERY 6 HOURS PRN
Qty: 20 TABLET | Refills: 0 | Status: SHIPPED | OUTPATIENT
Start: 2025-07-10

## 2025-07-10 NOTE — ED PROVIDER NOTES
"Subjective   History of Present Illness  Chief complaint: Patient is a 30-year-old who presents with abdominal pain.  She is very distressed currently and cannot provide significant history.  She has had progressive pain since Friday.  She really has not had much of a bowel movement since last week as well.  She tried laxatives today with no help.  She has not had problems like this before.  She has not had fever.  She states her pain is \"everywhere\".  She has not had any abdominal surgeries in the past.    Context:    Duration:    Timing:    Severity:    Associated Symptoms:        PCP:  LMP:      Review of Systems   Constitutional:  Negative for fever.   Respiratory: Negative.     Cardiovascular: Negative.    Gastrointestinal:  Positive for abdominal pain, constipation, nausea and vomiting.   Genitourinary: Negative.        No past medical history on file.    Allergies   Allergen Reactions    Droperidol Anxiety and Palpitations    Nickel        No past surgical history on file.    No family history on file.    Social History     Socioeconomic History    Marital status: Single   Tobacco Use    Smoking status: Never   Substance and Sexual Activity    Alcohol use: Yes     Comment: occasionally    Drug use: Yes     Types: Marijuana           Objective   Physical Exam  Vitals and nursing note reviewed.   Constitutional:       General: She is in acute distress.   HENT:      Head: Normocephalic and atraumatic.   Cardiovascular:      Rate and Rhythm: Normal rate.   Pulmonary:      Effort: Pulmonary effort is normal.      Breath sounds: Normal breath sounds.   Abdominal:      General: Abdomen is protuberant. Bowel sounds are normal.      Palpations: Abdomen is soft.      Tenderness: There is generalized abdominal tenderness.   Skin:     General: Skin is warm and dry.   Neurological:      Mental Status: She is alert and oriented to person, place, and time.   Psychiatric:         Mood and Affect: Mood is anxious. "         Procedures           ED Course                                           Results for orders placed or performed during the hospital encounter of 07/09/25   Comprehensive Metabolic Panel    Collection Time: 07/09/25  9:48 PM    Specimen: Blood   Result Value Ref Range    Glucose 148 (H) 65 - 99 mg/dL    BUN 8.7 6.0 - 20.0 mg/dL    Creatinine 0.77 0.57 - 1.00 mg/dL    Sodium 140 136 - 145 mmol/L    Potassium 3.7 3.5 - 5.2 mmol/L    Chloride 104 98 - 107 mmol/L    CO2 18.9 (L) 22.0 - 29.0 mmol/L    Calcium 9.6 8.6 - 10.5 mg/dL    Total Protein 7.8 6.0 - 8.5 g/dL    Albumin 4.7 3.5 - 5.2 g/dL    ALT (SGPT) <5 1 - 33 U/L    AST (SGOT) 17 1 - 32 U/L    Alkaline Phosphatase 57 39 - 117 U/L    Total Bilirubin 0.6 0.0 - 1.2 mg/dL    Globulin 3.1 gm/dL    A/G Ratio 1.5 g/dL    BUN/Creatinine Ratio 11.3 7.0 - 25.0    Anion Gap 17.1 (H) 5.0 - 15.0 mmol/L    eGFR 107.2 >60.0 mL/min/1.73   Lipase    Collection Time: 07/09/25  9:48 PM    Specimen: Blood   Result Value Ref Range    Lipase 27 13 - 60 U/L   hCG, Serum, Qualitative    Collection Time: 07/09/25  9:48 PM    Specimen: Blood   Result Value Ref Range    HCG Qualitative Negative Negative   CBC Auto Differential    Collection Time: 07/09/25  9:48 PM    Specimen: Blood   Result Value Ref Range    WBC 8.05 3.40 - 10.80 10*3/mm3    RBC 4.69 3.77 - 5.28 10*6/mm3    Hemoglobin 13.0 12.0 - 15.9 g/dL    Hematocrit 37.1 34.0 - 46.6 %    MCV 79.1 79.0 - 97.0 fL    MCH 27.7 26.6 - 33.0 pg    MCHC 35.0 31.5 - 35.7 g/dL    RDW 13.0 12.3 - 15.4 %    RDW-SD 37.0 37.0 - 54.0 fl    MPV 11.3 6.0 - 12.0 fL    Platelets 281 140 - 450 10*3/mm3   Scan Slide    Collection Time: 07/09/25  9:48 PM    Specimen: Blood   Result Value Ref Range    Scan Slide     Manual Differential    Collection Time: 07/09/25  9:48 PM    Specimen: Blood   Result Value Ref Range    Neutrophil % 84.0 (H) 42.7 - 76.0 %    Lymphocyte % 11.0 (L) 19.6 - 45.3 %    Monocyte % 4.0 (L) 5.0 - 12.0 %    Basophil % 1.0 0.0  - 1.5 %    Neutrophils Absolute 6.76 1.70 - 7.00 10*3/mm3    Lymphocytes Absolute 0.89 0.70 - 3.10 10*3/mm3    Monocytes Absolute 0.32 0.10 - 0.90 10*3/mm3    Basophils Absolute 0.08 0.00 - 0.20 10*3/mm3    RBC Morphology Normal Normal    WBC Morphology Normal Normal    Platelet Estimate Adequate Normal   Urinalysis With Culture If Indicated - Urine, Clean Catch    Collection Time: 07/09/25 11:38 PM    Specimen: Urine, Clean Catch   Result Value Ref Range    Color, UA Yellow Yellow, Straw    Appearance, UA Clear Clear    pH, UA 8.5 (H) 5.0 - 8.0    Specific Gravity, UA 1.084 (H) 1.005 - 1.030    Glucose, UA Negative Negative    Ketones, UA 15 mg/dL (1+) (A) Negative    Bilirubin, UA Negative Negative    Blood, UA Negative Negative    Protein, UA Negative Negative    Leuk Esterase, UA Negative Negative    Nitrite, UA Negative Negative    Urobilinogen, UA 1.0 E.U./dL 0.2 - 1.0 E.U./dL          CT Abdomen Pelvis With Contrast  Result Date: 7/9/2025  1.Moderate stool in the proximal colon. Stool burden is otherwise normal. No bowel obstruction. Normal appendix. 2.1.9 cm left ovarian cyst. 3.Trace free fluid in the cul-de-sac is likely physiologic. 4.Normal nonobstructed kidneys. Electronically Signed: Jigar Gardiner MD  7/9/2025 10:54 PM EDT  Workstation ID: [id number]           Medical Decision Making  Patient was seen evaluate for the above problem    Differential diagnosis includes was not limited to bowel obstruction, ruptured ectopic pregnancy, ovarian cyst, gastroenteritis, constipation    Patient did not had bowel movement.  She did take a laxative and states that shortly after that she began to have this diffuse abdominal pain.  Potentially had some colonic and small bowel spasms secondary to that.  She did improve with medication and treatment here.  Her CT scan showed no emergent finding.  She does not have stool impaction.  She has moderate stool in the proximal colon.  I discussed stool softeners outpatient.   She verbalized understanding she will be given Zofran for nausea as well.  She showed no signs of an ovarian torsion.  Her pregnancy test was negative.  She does have a cyst.  She has no pain in that left side.  Her reevaluation is much improved.  She will be discharged follow-up outpatient return for worsening symptoms signs or concerns.    Problems Addressed:  Abdominal pain, unspecified abdominal location: complicated acute illness or injury  Constipation, unspecified constipation type: complicated acute illness or injury  Cyst of ovary, unspecified laterality: complicated acute illness or injury  Nausea and vomiting, unspecified vomiting type: complicated acute illness or injury    Amount and/or Complexity of Data Reviewed  Labs: ordered. Decision-making details documented in ED Course.     Details: Labs reviewed by myself  Radiology: ordered. Decision-making details documented in ED Course.     Details: CT reviewed by myself as above    Risk  OTC drugs.  Prescription drug management.  Parenteral controlled substances.        Final diagnoses:   None   Abdominal pain  Vomiting  Constipation  Ovarian cyst    ED Disposition  ED Disposition       None            No follow-up provider specified.       Medication List      No changes were made to your prescriptions during this visit.            Sandeep Mcgregor DO  07/10/25 0018

## 2025-07-10 NOTE — ED NOTES
"This nurse went into patients room with medications and IV supplies. Patient sat up on the side of the bed and was screaming in pain. This nurse explained that I had pain medication and nausea medication to give via IV once I get the IV started. This nurse puts on the tourniquet and sets up IV supplies. Patient states she needs to use the bathroom. This nurse explained that we also need a urine sample that was ordered by the Dr. Patient removes tourniquet and stands up yelling \"I'm in pain and need to use the bathroom\". This nurse reeducated patient that I have pain medication to give her and help with that pain, but I have to start the IV first before I can give it. Patient continues to [facility] and mother of patient assists patient out of the room and asks for the location of the bathroom. This nurse points out the bathroom location and patient stops in hallway, vomits on floor, and mother helps patient walk to restroom where patient is still screaming in pain, but refused to let nurse give medication before going to restroom. Dilaudid being returned to the [facility] until patient was finished in the restroom  "